# Patient Record
Sex: MALE | URBAN - NONMETROPOLITAN AREA
[De-identification: names, ages, dates, MRNs, and addresses within clinical notes are randomized per-mention and may not be internally consistent; named-entity substitution may affect disease eponyms.]

---

## 2023-11-03 ENCOUNTER — OFFICE VISIT (OUTPATIENT)
Dept: URBAN - NONMETROPOLITAN AREA CLINIC 4 | Facility: CLINIC | Age: 48
End: 2023-11-03
Payer: COMMERCIAL

## 2023-11-03 ENCOUNTER — LAB OUTSIDE AN ENCOUNTER (OUTPATIENT)
Dept: URBAN - NONMETROPOLITAN AREA CLINIC 4 | Facility: CLINIC | Age: 48
End: 2023-11-03

## 2023-11-03 VITALS
DIASTOLIC BLOOD PRESSURE: 93 MMHG | TEMPERATURE: 97.3 F | BODY MASS INDEX: 42.49 KG/M2 | HEART RATE: 90 BPM | WEIGHT: 296.8 LBS | SYSTOLIC BLOOD PRESSURE: 141 MMHG | HEIGHT: 70 IN

## 2023-11-03 DIAGNOSIS — Z86.010 PERSONAL HISTORY OF COLONIC POLYPS: ICD-10-CM

## 2023-11-03 DIAGNOSIS — K76.0 FATTY LIVER: ICD-10-CM

## 2023-11-03 DIAGNOSIS — R79.89 ELEVATED LFTS: ICD-10-CM

## 2023-11-03 DIAGNOSIS — R93.89 ABNORMAL ULTRASOUND: ICD-10-CM

## 2023-11-03 DIAGNOSIS — E11.9 TYPE 2 DIABETES MELLITUS WITHOUT COMPLICATION, WITHOUT LONG-TERM CURRENT USE OF INSULIN: ICD-10-CM

## 2023-11-03 DIAGNOSIS — E80.6 HYPERBILIRUBINEMIA: ICD-10-CM

## 2023-11-03 DIAGNOSIS — E66.01 MORBID (SEVERE) OBESITY DUE TO EXCESS CALORIES: ICD-10-CM

## 2023-11-03 DIAGNOSIS — E78.2 MIXED HYPERLIPIDEMIA: ICD-10-CM

## 2023-11-03 DIAGNOSIS — I10 PRIMARY HYPERTENSION: ICD-10-CM

## 2023-11-03 PROBLEM — 59621000: Status: ACTIVE | Noted: 2023-11-03

## 2023-11-03 PROBLEM — 428283002: Status: ACTIVE | Noted: 2023-11-03

## 2023-11-03 PROBLEM — 313436004: Status: ACTIVE | Noted: 2023-11-03

## 2023-11-03 PROBLEM — 408512008: Status: ACTIVE | Noted: 2023-11-03

## 2023-11-03 PROBLEM — 267434003: Status: ACTIVE | Noted: 2023-11-03

## 2023-11-03 PROBLEM — 169255008: Status: ACTIVE | Noted: 2023-11-03

## 2023-11-03 PROCEDURE — 99204 OFFICE O/P NEW MOD 45 MIN: CPT | Performed by: REGISTERED NURSE

## 2023-11-03 RX ORDER — ATORVASTATIN CALCIUM 20 MG/1
1 TABLET TABLET, FILM COATED ORAL ONCE A DAY
Status: ACTIVE | COMMUNITY

## 2023-11-03 RX ORDER — METFORMIN HYDROCHLORIDE 500 MG/1
1 TABLET WITH A MEAL TABLET, FILM COATED ORAL ONCE A DAY
Status: ACTIVE | COMMUNITY

## 2023-11-03 RX ORDER — TRIAMTERENE AND HYDROCHLOROTHIAZIDE 37.5; 25 MG/1; MG/1
1 TABLET IN THE MORNING TABLET ORAL ONCE A DAY
Status: ACTIVE | COMMUNITY

## 2023-11-03 NOTE — HPI-TODAY'S VISIT:
11/3/23: Pt is a 46 yo male with PMH of HTN, HLD, DM2 who was referred by Dr. Meredith Espinoza for evaluation of elevated LFTs, Tbili and abnormal US.  A copy of this document will be sent to the referring physician.   Per labs 10/16/23, AST 31, ALT 64, Tbili 1.3, Alk phos 58. He had an US on 9/22/23 that revealed hepatomegaly measuring 18 cm, fatty liver wihtout evidence of liver masses and mild gallbladder wall thickening measuring 3.1 mm. No evidence of cholelithiasis or bile duct dilation.   He denies any abdominal pain, N/V. Daughter had GB removed last year. He denies any personal or family history of chronic liver disease. He drinks alcohol rarely.  He started Ozempic in Nov/Dec 2022. Has lost 35 lbs so far. BG  usually 100-105 in mornings. Last cscope in 2022 by [facility] that revealed precancerous polyp(s).

## 2023-11-04 LAB
ALBUMIN/GLOBULIN RATIO: 1.6
ALBUMIN: 4.4
ALKALINE PHOSPHATASE: 71
ALT: 43
AST: 24
BILIRUBIN, DIRECT: 0.2
BILIRUBIN, INDIRECT: 0.8
BILIRUBIN, TOTAL: 1
BILIRUBIN, TOTAL: 1
BUN/CREATININE RATIO: (no result)
CALCIUM: 10
CARBON DIOXIDE: 29
CHLORIDE: 101
CREATININE: 1.01
EGFR: 91
FIB 4 INDEX: 0.59
FIB 4 INTERPRETATION: (no result)
GLOBULIN: 2.7
GLUCOSE: 137
PLATELET COUNT: 303
POTASSIUM: 4.2
PROTEIN, TOTAL: 7.1
SODIUM: 140
UREA NITROGEN (BUN): 21

## 2024-02-02 ENCOUNTER — OV EP (OUTPATIENT)
Dept: URBAN - NONMETROPOLITAN AREA CLINIC 4 | Facility: CLINIC | Age: 49
End: 2024-02-02
Payer: COMMERCIAL

## 2024-02-02 VITALS
SYSTOLIC BLOOD PRESSURE: 152 MMHG | BODY MASS INDEX: 40.74 KG/M2 | TEMPERATURE: 97.8 F | HEART RATE: 87 BPM | DIASTOLIC BLOOD PRESSURE: 94 MMHG | WEIGHT: 284.6 LBS | HEIGHT: 70 IN

## 2024-02-02 DIAGNOSIS — R79.89 ELEVATED LFTS: ICD-10-CM

## 2024-02-02 DIAGNOSIS — E78.2 MIXED HYPERLIPIDEMIA: ICD-10-CM

## 2024-02-02 DIAGNOSIS — E11.9 TYPE 2 DIABETES MELLITUS WITHOUT COMPLICATION, WITHOUT LONG-TERM CURRENT USE OF INSULIN: ICD-10-CM

## 2024-02-02 DIAGNOSIS — E66.01 MORBID (SEVERE) OBESITY DUE TO EXCESS CALORIES: ICD-10-CM

## 2024-02-02 DIAGNOSIS — E80.6 HYPERBILIRUBINEMIA: ICD-10-CM

## 2024-02-02 DIAGNOSIS — Z86.010 PERSONAL HISTORY OF COLONIC POLYPS: ICD-10-CM

## 2024-02-02 DIAGNOSIS — R93.89 ABNORMAL ULTRASOUND: ICD-10-CM

## 2024-02-02 DIAGNOSIS — I10 PRIMARY HYPERTENSION: ICD-10-CM

## 2024-02-02 DIAGNOSIS — K76.0 FATTY LIVER: ICD-10-CM

## 2024-02-02 PROBLEM — 197321007: Status: ACTIVE | Noted: 2023-11-03

## 2024-02-02 PROBLEM — 14783006: Status: ACTIVE | Noted: 2023-11-03

## 2024-02-02 PROBLEM — 83911000119104: Status: ACTIVE | Noted: 2023-11-03

## 2024-02-02 PROCEDURE — 99213 OFFICE O/P EST LOW 20 MIN: CPT | Performed by: REGISTERED NURSE

## 2024-02-02 RX ORDER — TRIAMTERENE AND HYDROCHLOROTHIAZIDE 37.5; 25 MG/1; MG/1
1 TABLET IN THE MORNING TABLET ORAL ONCE A DAY
Status: ACTIVE | COMMUNITY

## 2024-02-02 RX ORDER — METFORMIN HYDROCHLORIDE 500 MG/1
1 TABLET WITH A MEAL TABLET, FILM COATED ORAL ONCE A DAY
Status: ACTIVE | COMMUNITY

## 2024-02-02 RX ORDER — ATORVASTATIN CALCIUM 20 MG/1
1 TABLET TABLET, FILM COATED ORAL ONCE A DAY
Status: ACTIVE | COMMUNITY

## 2024-02-02 NOTE — HPI-TODAY'S VISIT:
11/3/23: Pt is a 48 yo male with PMH of HTN, HLD, DM2 who was referred by Dr. Meredith Espinoza for evaluation of elevated LFTs, Tbili and abnormal US.  A copy of this document will be sent to the referring physician.   Per labs 10/16/23, AST 31, ALT 64, Tbili 1.3, Alk phos 58. He had an US on 9/22/23 that revealed hepatomegaly measuring 18 cm, fatty liver without evidence of liver masses and mild gallbladder wall thickening measuring 3.1 mm. No evidence of cholelithiasis or bile duct dilation.   He denies any abdominal pain, N/V. Daughter had GB removed last year. He denies any personal or family history of chronic liver disease. He drinks alcohol rarely.  He started Ozempic in Nov/Dec 2022. Has lost 35 lbs so far. BG  usually 100-105 in mornings. Last cscope in 2022 by [facility] that revealed precancerous polyp(s).  2/2/24: Pt RTC for f/u. Per labs 11/3/23, AST 24, ALT 42, Tbili 1.0, direct bili 0.2, indirect bili 0.8. [id number] 0.59. He has lost apprx 12 lbs since last OV in November. He remains on Ozempic. Recent labs at PCP 1/24/24, AST 27, ALT 46, Tbili 1.8. Ne denies any current GI complaints. He thinks his last cscope was in Oct 2022 that showed polyp(s) and was recommended to repeat in 2 yrs.